# Patient Record
Sex: FEMALE | Race: WHITE | NOT HISPANIC OR LATINO | Employment: OTHER | URBAN - METROPOLITAN AREA
[De-identification: names, ages, dates, MRNs, and addresses within clinical notes are randomized per-mention and may not be internally consistent; named-entity substitution may affect disease eponyms.]

---

## 2019-12-19 NOTE — PATIENT DISCUSSION
CATARACTS, OU: VISUALLY SIGNIFICANT. OPTION OF SURGERY VERSUS FOLLOWING VERSUS UPDATING GLASSES DISCUSSED. RBA'S DISCUSSED, PATIENT UNDERSTANDS AND DESIRES SURGERY TO INCREASE VISION FOR READING, WATCHING TV AND TO REDUCE GLARE IN ORDER TO DRIVE SAFELY AT NIGHT. SCHEDULE CATARACT SURGERY/PRE-OP OU.

## 2022-11-29 ENCOUNTER — ESTABLISHED PATIENT (OUTPATIENT)
Dept: URBAN - METROPOLITAN AREA CLINIC 44 | Facility: CLINIC | Age: 72
End: 2022-11-29

## 2022-11-29 ENCOUNTER — PRE-OP/H&P (OUTPATIENT)
Dept: URBAN - METROPOLITAN AREA CLINIC 44 | Facility: CLINIC | Age: 72
End: 2022-11-29

## 2022-11-29 DIAGNOSIS — C44.310: ICD-10-CM

## 2022-11-29 DIAGNOSIS — C44.320: ICD-10-CM

## 2022-11-29 PROCEDURE — 99213 OFFICE O/P EST LOW 20 MIN: CPT

## 2022-11-29 PROCEDURE — 99211HP H&P OFFICE/OUTPATIENT VISIT, EST

## 2022-11-29 PROCEDURE — 92285 EXTERNAL OCULAR PHOTOGRAPHY: CPT

## 2022-12-19 ENCOUNTER — PRE-OP/H&P (OUTPATIENT)
Dept: URBAN - METROPOLITAN AREA SURGERY 14 | Facility: SURGERY | Age: 72
End: 2022-12-19

## 2022-12-19 PROCEDURE — 99211T TECH SERVICE

## 2023-01-16 ENCOUNTER — SURGERY/PROCEDURE (OUTPATIENT)
Dept: URBAN - METROPOLITAN AREA SURGERY 14 | Facility: SURGERY | Age: 73
End: 2023-01-16

## 2023-01-16 ENCOUNTER — PRE-OP/H&P (OUTPATIENT)
Dept: URBAN - METROPOLITAN AREA SURGERY 14 | Facility: SURGERY | Age: 73
End: 2023-01-16

## 2023-01-16 DIAGNOSIS — C44.310: ICD-10-CM

## 2023-01-16 DIAGNOSIS — C44.320: ICD-10-CM

## 2023-01-16 PROCEDURE — 99211T TECH SERVICE

## 2023-01-16 PROCEDURE — 14061 TIS TRNFR E/N/E/L10.1-30SQCM: CPT

## 2023-01-24 ENCOUNTER — POST-OP (OUTPATIENT)
Dept: URBAN - METROPOLITAN AREA CLINIC 44 | Facility: CLINIC | Age: 73
End: 2023-01-24

## 2023-01-24 DIAGNOSIS — C44.310: ICD-10-CM

## 2023-01-24 DIAGNOSIS — Z98.890: ICD-10-CM

## 2023-01-24 PROCEDURE — 92285 EXTERNAL OCULAR PHOTOGRAPHY: CPT

## 2023-01-24 PROCEDURE — 99024 POSTOP FOLLOW-UP VISIT: CPT

## 2023-05-04 ENCOUNTER — POST-OP (OUTPATIENT)
Dept: URBAN - METROPOLITAN AREA CLINIC 44 | Facility: CLINIC | Age: 73
End: 2023-05-04

## 2023-05-04 DIAGNOSIS — C44.310: ICD-10-CM

## 2023-05-04 DIAGNOSIS — C44.320: ICD-10-CM

## 2023-05-04 DIAGNOSIS — Z98.890: ICD-10-CM

## 2023-05-04 PROCEDURE — 11900 INJECT SKIN LESIONS </W 7: CPT

## 2023-05-04 PROCEDURE — 99024 POSTOP FOLLOW-UP VISIT: CPT

## 2023-06-08 ENCOUNTER — POST-OP (OUTPATIENT)
Dept: URBAN - METROPOLITAN AREA CLINIC 44 | Facility: CLINIC | Age: 73
End: 2023-06-08

## 2023-06-08 DIAGNOSIS — Z98.890: ICD-10-CM

## 2023-06-08 PROCEDURE — 99024 POSTOP FOLLOW-UP VISIT: CPT

## 2023-10-24 ENCOUNTER — ESTABLISHED PATIENT (OUTPATIENT)
Dept: URBAN - METROPOLITAN AREA CLINIC 44 | Facility: CLINIC | Age: 73
End: 2023-10-24

## 2023-10-24 DIAGNOSIS — Z98.890: ICD-10-CM

## 2023-10-24 PROCEDURE — 92285 EXTERNAL OCULAR PHOTOGRAPHY: CPT

## 2023-10-24 PROCEDURE — 99212 OFFICE O/P EST SF 10 MIN: CPT

## 2024-05-14 ENCOUNTER — AESTHETICIAN SERVICES (OUTPATIENT)
Dept: URBAN - METROPOLITAN AREA CLINIC 39 | Facility: CLINIC | Age: 74
End: 2024-05-14

## 2024-05-14 DIAGNOSIS — L90.8: ICD-10-CM

## 2024-05-14 PROCEDURE — 17999CT COMBO TREATMENT

## 2024-09-18 ENCOUNTER — AESTHETICIAN SERVICES (OUTPATIENT)
Dept: URBAN - METROPOLITAN AREA CLINIC 39 | Facility: CLINIC | Age: 74
End: 2024-09-18

## 2024-09-18 DIAGNOSIS — L90.8: ICD-10-CM

## 2024-09-18 PROCEDURE — 17999CT COMBO TREATMENT

## 2024-12-11 ENCOUNTER — AESTHETICIAN SERVICES (OUTPATIENT)
Age: 74
End: 2024-12-11

## 2024-12-11 DIAGNOSIS — L90.8: ICD-10-CM

## 2024-12-11 PROCEDURE — 17999CT COMBO TREATMENT

## 2024-12-31 ENCOUNTER — NEW PATIENT (OUTPATIENT)
Age: 74
End: 2024-12-31

## 2024-12-31 DIAGNOSIS — H01.00B: ICD-10-CM

## 2024-12-31 DIAGNOSIS — H52.7: ICD-10-CM

## 2024-12-31 DIAGNOSIS — Z96.1: ICD-10-CM

## 2024-12-31 DIAGNOSIS — H01.00A: ICD-10-CM

## 2024-12-31 DIAGNOSIS — D31.32: ICD-10-CM

## 2024-12-31 PROCEDURE — 92015 DETERMINE REFRACTIVE STATE: CPT

## 2024-12-31 PROCEDURE — 99204 OFFICE O/P NEW MOD 45 MIN: CPT

## 2025-08-19 ENCOUNTER — EMERGENCY VISIT (OUTPATIENT)
Age: 75
End: 2025-08-19

## 2025-08-19 DIAGNOSIS — H01.00A: ICD-10-CM

## 2025-08-19 DIAGNOSIS — B88.0: ICD-10-CM

## 2025-08-19 DIAGNOSIS — H10.13: ICD-10-CM

## 2025-08-19 DIAGNOSIS — H01.00B: ICD-10-CM

## 2025-08-19 PROCEDURE — 99214 OFFICE O/P EST MOD 30 MIN: CPT

## 2025-08-19 RX ORDER — FLUOROMETHOLONE ACETATE 1 MG/ML: 1 SUSPENSION/ DROPS OPHTHALMIC TWICE A DAY

## 2025-08-19 RX ORDER — LOTILANER OPHTHALMIC SOLUTION 2.5 MG/ML: 1 SOLUTION/ DROPS OPHTHALMIC TWICE A DAY
